# Patient Record
Sex: MALE | Race: WHITE
[De-identification: names, ages, dates, MRNs, and addresses within clinical notes are randomized per-mention and may not be internally consistent; named-entity substitution may affect disease eponyms.]

---

## 2018-09-28 ENCOUNTER — HOSPITAL ENCOUNTER (EMERGENCY)
Dept: HOSPITAL 89 - ER | Age: 28
Discharge: HOME | End: 2018-09-28
Payer: SELF-PAY

## 2018-09-28 VITALS — SYSTOLIC BLOOD PRESSURE: 128 MMHG | DIASTOLIC BLOOD PRESSURE: 93 MMHG

## 2018-09-28 DIAGNOSIS — K08.89: Primary | ICD-10-CM

## 2018-09-28 PROCEDURE — 99282 EMERGENCY DEPT VISIT SF MDM: CPT

## 2018-09-28 PROCEDURE — 99283 EMERGENCY DEPT VISIT LOW MDM: CPT

## 2018-09-28 NOTE — ER REPORT
History and Physical


Time Seen By MD:  13:35


Hx. of Stated Complaint:  


RT SIDE TOOTH PAIN. HAS BEEN BREAKING OFF OVER A LONG PERIOD. HAS A DENTIST 


APPOINTMENT ON WED. HERE TODAY FOR PAIN AND POSSIBLE INFECTION


HPI/ROS


CHIEF COMPLAINT: dental pain





HISTORY OF PRESENT ILLNESS: pt reports hx of multiple dental cavities; reports 


increased pain since he broke off additional piece of r lower molar last night 


while eating grilled cheese. Pt has attempted ibuprofen and tylenol without 


relief. Reports that he has dental appointment early next week





REVIEW OF SYSTEMS:


Respiratory: No cough, no dyspnea.


Cardiovascular: No chest pain, no palpitations.


Gastrointestinal: No vomiting, no abdominal pain.


Musculoskeletal: No back pain.


Allergies:  


Coded Allergies:  


     cefuroxime (Verified  Allergy, Unknown, 9/28/18)


   


   % SURE THIS IS IT. [facility] WILL KNOW WHAT IT IS


Home Meds


Active Scripts


Penicillin V Potassium 500 Mg Tab (PENICILLIN V POTASSIUM 500 MG TAB) 500 Mg 


Tablet, 500 MG PO Q12H for 7 Days, #14 TAB


   Prov:LES EDMONDSON MD         9/28/18


Discontinued Scripts


Hydrocodone Bit/Acetaminophen (NORCO 5-325 TABLET) 1 Each Tablet, 1 EACH PO Q4H 


PRN for PAIN, #12


   Prov:ZULMA MICHEL DO         5/15/15


Tramadol Hcl (TRAMADOL HCL) 50 Mg Tablet, 50 MG PO Q6H PRN for PAIN, #20 MG


   TAKE ONE TO TWO TABLETS BY MOUTH EVERY FOUR TO SIX HOURS AS NEEDED


   Prov:ZULMA MICHEL DO         5/15/15


Reviewed Nurses Notes:  Yes


Hx Smoking:  Yes (1ppd)


Smoking Status:  Current: Every Day Smoker


Hx Substance Use Disorder:  No


Hx Alcohol Use:  No


Constitutional





Vital Sign - Last 24 Hours








 9/28/18





 13:32


 


Temp 98.0


 


Pulse 64


 


Resp 18


 


B/P (MAP) 128/93


 


Pulse Ox 97


 


O2 Delivery Room Air








Physical Exam


  General Appearance: The patient is alert, has no immediate need for airway 


protection and no current signs of toxicity.  


Eyes: Pupils equal and round no injection.


OP  - multiple dental caries. Pt does not have tooth #32; #31 with tenderness to


percussion and e/o fracture though unable to clearly discern that this is acute.


No e/o abscess


Respiratory: Chest is non tender, lungs are clear to auscultation.


Cardiac: regular rate and rhythm 


Musculoskeletal:  Neck: no lymphadenopathy, no tenderness


   Extremities have full range of motion and are non tender.


Skin: No rashes or lesions.





DIFFERENTIAL DIAGNOSIS: After history and physical exam differential diagnosis 


was considered for dental fracture, abscess, other infection





Medical Decision Making


ED Course/Re-evaluation


ED Course


Patient presents with dental pain. He does not have evidence of abscess but may 


have a new fracture. I discussed risk and benefits of management with pain 


medications versus dental block. Patient agrees to LX for dental block. Dental 


block performed with excellent results. Pain is completely resolved. We'll place


patient on penicillin which he states he has tolerated in the past, with close 


dental follow-up and strict return precautions.


Procedure





Procedure:  Regional anesthesia.





A [dental block] was performed r mandibular dental pain.  The block was 


performed with marcaine and lidocaine.  The patient experienced complete pain 


relief.  The procedure was performed by myself.


Decision to Disposition Date:  Sep 28, 2018


Decision to Disposition Time:  14:10





Depart


Departure


Latest Vital Signs





Vital Signs








  Date Time  Temp Pulse Resp B/P (MAP) Pulse Ox O2 Delivery O2 Flow Rate FiO2


 


9/28/18 13:32 98.0 64 18 128/93 97 Room Air  








Impression:  


   Primary Impression:  


   Pain, dental


Condition:  Improved


Disposition:  HOME OR SELF-CARE


New Scripts


Penicillin V Potassium 500 Mg Tab (PENICILLIN V POTASSIUM 500 MG TAB) 500 Mg 


Tablet


500 MG PO Q12H for 7 Days, #14 TAB


   Prov: LES EDMONDSON MD         9/28/18


Departure Forms:  Medications Reconciliation,    Patient Portal Information, ER 


Transition Record


Patient Instructions:  Dental Caries (ED)





Additional Instructions:  


As we discussed, you may take ibuprofen 800mg every 8 hours and tylenol 1000mg 


every 6 hours for pain. Return for fevers, vomiting, uncontrolled pain, or any 


concerns.











LES EDMONDSON MD                Sep 28, 2018 14:06